# Patient Record
Sex: FEMALE | Race: WHITE | Employment: STUDENT | URBAN - METROPOLITAN AREA
[De-identification: names, ages, dates, MRNs, and addresses within clinical notes are randomized per-mention and may not be internally consistent; named-entity substitution may affect disease eponyms.]

---

## 2019-02-01 ENCOUNTER — TRANSFERRED RECORDS (OUTPATIENT)
Dept: HEALTH INFORMATION MANAGEMENT | Facility: CLINIC | Age: 24
End: 2019-02-01

## 2019-04-15 ENCOUNTER — MEDICAL CORRESPONDENCE (OUTPATIENT)
Dept: HEALTH INFORMATION MANAGEMENT | Facility: CLINIC | Age: 24
End: 2019-04-15

## 2019-08-22 ENCOUNTER — TELEPHONE (OUTPATIENT)
Dept: OPHTHALMOLOGY | Facility: CLINIC | Age: 24
End: 2019-08-22

## 2019-09-10 ENCOUNTER — OFFICE VISIT (OUTPATIENT)
Dept: OPHTHALMOLOGY | Facility: CLINIC | Age: 24
End: 2019-09-10
Attending: OPHTHALMOLOGY
Payer: COMMERCIAL

## 2019-09-10 DIAGNOSIS — H53.10 SUBJECTIVE VISUAL DISTURBANCE: ICD-10-CM

## 2019-09-10 DIAGNOSIS — H53.19 VISUAL SNOW SYNDROME: Primary | ICD-10-CM

## 2019-09-10 DIAGNOSIS — H53.10 SUBJECTIVE VISUAL DISTURBANCE: Primary | ICD-10-CM

## 2019-09-10 PROCEDURE — 92133 CPTRZD OPH DX IMG PST SGM ON: CPT | Mod: ZF | Performed by: OPHTHALMOLOGY

## 2019-09-10 PROCEDURE — G0463 HOSPITAL OUTPT CLINIC VISIT: HCPCS | Mod: ZF | Performed by: TECHNICIAN/TECHNOLOGIST

## 2019-09-10 PROCEDURE — 92083 EXTENDED VISUAL FIELD XM: CPT | Mod: ZF | Performed by: OPHTHALMOLOGY

## 2019-09-10 RX ORDER — DESVENLAFAXINE 100 MG/1
TABLET, EXTENDED RELEASE ORAL
COMMUNITY
Start: 2018-12-11

## 2019-09-10 RX ORDER — TOPIRAMATE 100 MG/1
100 TABLET, FILM COATED ORAL
COMMUNITY
Start: 2019-01-10

## 2019-09-10 RX ORDER — LAMOTRIGINE 150 MG/1
TABLET ORAL
COMMUNITY
Start: 2018-04-01

## 2019-09-10 RX ORDER — QUETIAPINE FUMARATE 50 MG/1
50 TABLET, FILM COATED ORAL
COMMUNITY
Start: 2018-01-01

## 2019-09-10 ASSESSMENT — SLIT LAMP EXAM - LIDS
COMMENTS: NORMAL
COMMENTS: NORMAL

## 2019-09-10 ASSESSMENT — CONF VISUAL FIELD
METHOD: COUNTING FINGERS
OD_NORMAL: 1
OS_NORMAL: 1

## 2019-09-10 ASSESSMENT — EXTERNAL EXAM - LEFT EYE: OS_EXAM: NORMAL

## 2019-09-10 ASSESSMENT — EXTERNAL EXAM - RIGHT EYE: OD_EXAM: NORMAL

## 2019-09-10 ASSESSMENT — VISUAL ACUITY
METHOD: SNELLEN - LINEAR
OS_SC: 20/20
OD_SC: 20/20
METHOD_MR: DIAGNOSTIC PURPOSES

## 2019-09-10 ASSESSMENT — CUP TO DISC RATIO
OD_RATIO: 0.25
OS_RATIO: 0.3

## 2019-09-10 ASSESSMENT — TONOMETRY
OD_IOP_MMHG: 20
IOP_METHOD: ICARE
OS_IOP_MMHG: 19

## 2019-09-10 ASSESSMENT — REFRACTION_MANIFEST
OS_SPHERE: PLANO
OD_SPHERE: -0.50
OD_CYLINDER: SPHERE

## 2019-09-10 NOTE — NURSING NOTE
Chief Complaint(s) and History of Present Illness(es)     New Patient     In both eyes (Consult for visual snow).  Associated symptoms include floaters, flashes, vomiting and nausea.  Negative for double vision.              Comments     Consult for visual disturbances per patient.   +nausea +vomitting    Patient reports image static and after images and trailing of images.   Pattern sickness: very close grid, exam's room carpet, texts on blank page    JACKIE Dejesus 9/10/2019 7:50 AM

## 2019-09-10 NOTE — LETTER
"9/10/2019         RE:  :  MRN: Veena Carpenter  1995  [id number]     Dear Dr. Murphy,    Thank you for asking me to see your very pleasant patient, Veena Carpenter, in neuro-ophthalmic consultation.  I would like to thank you for sending your records and I have summarized them in the history of present illness. My assessment and plan are below.  For further details, please see my attached clinic note.      Veena Carpenter is a 24 year old female with the following diagnoses:   1. Visual snow syndrome    2. Subjective visual disturbance       Veena Carpenter is a 24 year old female sent for consultation from Dr Murphy in [place] for evaluation of visual disturbance of both eyes. She reports 1 year ago noticing \"visual static or snow\" intermittently each eye that became constant in . It has not changed in the past 10 months. She also notes trailing of images worse in dim lighting occurring frequently. She reports floaters and flashes (unsure if central or peripheral). No diplopia. No headaches, whooshing in her hears, focal neurologic changes. She takes desvenlafaxine, lamotrigine, and quetiapine, topiramate. The lattermost was prescribed by Dr Murphy for presumptive migraines, 2019. The other medications are prescribed by her psychiatrist for depression, anxiety. No trauma or eye surgery. No fever or chills, otherwise healthy.  No use of eye drops.    VA 20/20 each eye. Intraocular pressure within normal limits.  She is orthophoric with full motility. Full color plates each eye.  Anterior segment exam normal both eyes.  Fundus exam normal both eyes.  Optical coherence tomography retinal nerve fiber layer and maculae within normal limits.  Visual field full each eye.    It is my impression that patient has visual snow.  This is an entity where patients see constant or nearly constant unformed hallucinations.  The most common description that patients give is television snow, however it has " been described as red dots, pixelations, and black spots.  Given the normal visual field, no further workup is necessary.  I reassured the patient that this is a common phenomenon and is not vision threatening.  I also told the patient that learning to ignore it is the best strategy and that there is no evidence-based treatment for it.           Again, thank you for allowing me to participate in the care of your patient.      Sincerely,    Romel Lainez MD  Professor  Ophthalmology Residency   Director of Neuro-Ophthalmology  [place] [facility] Chair  Departments of Ophthalmology, Neurology, and Neurosurgery  [facility] [address] [place] [address]  [id number]  T - [phone]  F - [phone]  [email or web address]       DX = persistent positive visual phenomena of migraine

## 2020-03-11 ENCOUNTER — HEALTH MAINTENANCE LETTER (OUTPATIENT)
Age: 25
End: 2020-03-11

## 2021-01-03 ENCOUNTER — HEALTH MAINTENANCE LETTER (OUTPATIENT)
Age: 26
End: 2021-01-03

## 2021-04-25 ENCOUNTER — HEALTH MAINTENANCE LETTER (OUTPATIENT)
Age: 26
End: 2021-04-25

## 2021-10-10 ENCOUNTER — HEALTH MAINTENANCE LETTER (OUTPATIENT)
Age: 26
End: 2021-10-10

## 2022-05-22 ENCOUNTER — HEALTH MAINTENANCE LETTER (OUTPATIENT)
Age: 27
End: 2022-05-22

## 2022-09-18 ENCOUNTER — HEALTH MAINTENANCE LETTER (OUTPATIENT)
Age: 27
End: 2022-09-18

## 2023-06-04 ENCOUNTER — HEALTH MAINTENANCE LETTER (OUTPATIENT)
Age: 28
End: 2023-06-04